# Patient Record
Sex: MALE | Race: WHITE
[De-identification: names, ages, dates, MRNs, and addresses within clinical notes are randomized per-mention and may not be internally consistent; named-entity substitution may affect disease eponyms.]

---

## 2017-05-29 ENCOUNTER — HOSPITAL ENCOUNTER (EMERGENCY)
Dept: HOSPITAL 76 - ED | Age: 22
Discharge: HOME | End: 2017-05-29
Payer: COMMERCIAL

## 2017-05-29 VITALS — DIASTOLIC BLOOD PRESSURE: 74 MMHG | SYSTOLIC BLOOD PRESSURE: 144 MMHG

## 2017-05-29 DIAGNOSIS — Z87.891: ICD-10-CM

## 2017-05-29 DIAGNOSIS — S93.401A: Primary | ICD-10-CM

## 2017-05-29 PROCEDURE — 99283 EMERGENCY DEPT VISIT LOW MDM: CPT

## 2017-05-29 PROCEDURE — 73610 X-RAY EXAM OF ANKLE: CPT

## 2017-05-29 PROCEDURE — 99282 EMERGENCY DEPT VISIT SF MDM: CPT

## 2017-05-29 NOTE — XRAY PRELIMINARY REPORT
Accession: [id number]

Exam: XR Ankle 3 View RT

 

IMPRESSION: Normal ankle radiography.

 

[facility]

 

SITE ID: [id number]

## 2017-05-29 NOTE — XRAY REPORT
EXAM:

RIGHT ANKLE RADIOGRAPHY

 

EXAM DATE: 5/29/2017 09:44 PM.

 

CLINICAL HISTORY: Rolled ankle hardly able to bear weight.

 

COMPARISON: None.

 

TECHNIQUE: 3 views.

 

FINDINGS: 

Bones: Normal. No fractures or bone lesions.

 

Joints: Normal. No effusion. No subluxations. The ankle mortise is normally aligned.

 

Soft Tissues: Normal. No soft tissue swelling.

 

IMPRESSION: Normal ankle radiography.

 

[facility]

Referring Provider Line: [phone]

 

SITE ID: [id number]

## 2017-05-29 NOTE — ED PHYSICIAN DOCUMENTATION
PD HPI LOWER EXT INJURY





- Stated complaint


Stated Complaint: R ANKLE INJ





- Chief complaint


Chief Complaint: Ext Problem





- History obtained from


History obtained from: Patient





- History of Present Illness


PD HPI LOW EXT INJURY LOCATION: Right, Ankle


Type of injury: Twist


Timing - onset: Today


Timing - details: Abrupt onset, Still present


Worsened by: Moving, Palpating, Other (walking)


Associated symptoms: Swelling.  No: Weakness, Numbness


Similar symptoms before: Has not had sx before


Recently seen: Not recently seen





Review of Systems


Constitutional: denies: Fever, Chills


Skin: denies: Abrasion (s), Laceration (s)


Neurologic: denies: Focal weakness, Numbness





PD PAST MEDICAL HISTORY





- Past Medical History


Cardiovascular: None


Respiratory: None


Neuro: None


Endocrine/Autoimmune: None


GI: None


: None


HEENT: None


Psych: None


Musculoskeletal: None


Derm: None





- Past Surgical History


Past Surgical History: No





- Present Medications


Home Medications: 


 Ambulatory Orders











 Medication  Instructions  Recorded  Confirmed


 


No Known Home Medications [No  05/29/17 05/29/17





Known Home Medications]   














- Allergies


Allergies/Adverse Reactions: 


 Allergies











Allergy/AdvReac Type Severity Reaction Status Date / Time


 


No Known Drug Allergies Allergy   Verified 05/29/17 21:13














- Social History


Does the pt smoke?: No


Smoking Status: Former smoker


Does the pt drink ETOH?: Yes


Does the pt have substance abuse?: No





- Immunizations


Immunizations are current?: Yes





PD ED PE NORMAL





- Vitals


Vital signs reviewed: Yes





- General


General: Alert and oriented X 3, No acute distress, Well developed/nourished





- HEENT


HEENT: Atraumatic





- Neck


Neck: Supple, no meningeal sign, No bony TTP





- Derm


Derm: Normal color, Warm and dry





- Extremities


Extremities: Other (right ankle with lateral swelling. Achilles not tender. 

Normal color and cap refill in toes. Foot distally not tender. No gross laxity 

on stress testing.)





- Neuro


Neuro: Alert and oriented X 3, No motor deficit, No sensory deficit, Normal 

speech





Results





- Vitals


Vitals: 


 Oxygen











O2 Source                      Room air

















- Rads (name of study)


  ** ankle


Radiology: Prelim report reviewed (no fractures), EMP read contemporaneously (

no fracture)





PD MEDICAL DECISION MAKING





- ED course


Complexity details: reviewed results, considered differential, d/w patient





Departure





- Departure


Disposition:  Home, Self Care


Clinical Impression: 


Ankle sprain


Qualifiers:


 Encounter type: initial encounter Involved ligament of ankle: other ligament 

Laterality: right Qualified Code(s): S93.491A - Sprain of other ligament of 

right ankle, initial encounter


Condition: Stable


Record reviewed to determine appropriate education?: Yes


Instructions:  ED Sprain Ankle W X Ray


Follow-Up: 


[facility] [place] [Provider Group]


Comments: 


Ankle brace when up and around for 2-3 weeks until fully healed. Crutches as 

needed initially for pain, and progress weight bearing as able. Ibuprofen 3 

times daily, and add Tylenol if needed. Recheck if not better over the next 

several days to a week. 


Forms:  Activity restrictions


Discharge Date/Time: 05/29/17 23:05

## 2023-03-22 ENCOUNTER — HOSPITAL ENCOUNTER (OUTPATIENT)
Dept: HOSPITAL 76 - SC | Age: 28
Discharge: HOME | End: 2023-03-22
Attending: NURSE PRACTITIONER
Payer: COMMERCIAL

## 2023-03-22 VITALS — SYSTOLIC BLOOD PRESSURE: 150 MMHG | DIASTOLIC BLOOD PRESSURE: 100 MMHG

## 2023-03-22 DIAGNOSIS — G47.33: Primary | ICD-10-CM

## 2023-03-22 DIAGNOSIS — E66.9: ICD-10-CM

## 2023-03-22 PROCEDURE — 99212 OFFICE O/P EST SF 10 MIN: CPT

## 2023-03-22 PROCEDURE — 99203 OFFICE O/P NEW LOW 30 MIN: CPT

## 2023-03-22 NOTE — SLEEP CARE CONSULTATION
Information from patient questionnaire entered by Brenda Estrella.





I have reviewed and concur with the information entered by Brenda Estrella. This 

document represents the service I personally performed and the decisions made by

me, Julia Mosley ARNP.





History of Present Illness


Service Date and Time: 03/22/2023    1017


Reason for Visit: New patient


Chief Complaint: reports: Unrefreshed sleep, Snoring, Observed pauses in 

breathing, Frequent awakenings at night


Date of Onset: 3-4YRS


Usual bedtime: 10PM


Time it takes to fall asleep: 30-60MIN


Snores at night: Yes


Observed to quit breathing while asleep: Yes


Sleeps alone due to snoring: No


Number of times waking at night: 5+


Reasons for waking at night: reports: Gasping for air, Other (UNKNOWN; 

temperature).  denies: Choking, Snoring


Toss, Turn, or Twitch while sleeping: Yes


Recalls having dreams: Yes


Usually gets out of bed at: 6AM; weekends 0800


Feels refreshed in the morning: No


Morning headache: Yes (3 days a week; last about an hour)


Sleepy or fatigued during the day: Yes


Ever fallen asleep while driving: No


Takes day naps: Yes (only on weekends for 30-60 minutes)


Dreams during day naps: No


Prior sleep studies: Yes


Year and Where: 07/2019 [facility]


Additional HPI information: 





I had the pleasure of seeing JAMES ZAVALA today regarding the possibility of 

him having a sleep disorder. His current complaints are unrefreshed sleep, 

snoring, observed pauses in breathing and frequent night awakenings. He states 

he has had a previous sleep study in about 2019 and was placed on a CPAP 

machine. 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 He has not used it 

since about 2020. His sleep is not as regenerative as it has been in the past. 

He snores but his girlfriend states it is not every night. She has noted some 

pauses in breathing. She can still sleep in same room. 





- Parasomnia Symptoms


Ever been unable to move upon waking from sleep: Yes (2-3 times in life)


Walks in sleep: Yes (not for a "while")


Talks in sleep: Yes


Ever acted out dreams in sleep: No


Ever felt weak in the knees when startled or emotional: No


Bothered by creepy, crawly, restless sensations in legs: Yes (leg moving in bed,

without conciously doing it)


Problems with memory or concentration: Yes (both; concentration worse, hx of 

ADHD)





Subjective


Initial [place] Sleepiness Scale score: 8 (03/17/23)





Past Medical History


Past Medical History: reports: Anxiety, Depression, Attention deficit





Social History


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 Patient is Single and lives in . 





Have you smoked in the past 12 months: No


Alcohol use: Yes


Alcohol amount and frequency: 2-3 BEERS 1-2 X MONTH


Caffeine use: Yes


Caffeine amount and frequency: 2-3 DAILY





Family History


Family history of sleep disordered breathing: Yes


Family Hx Sleep Apnea: Father: Snoring, Sleep apnea - Untreated, Sibling: 

Snoring, Sleep apnea - Untreated, Grandparent: Snoring, Sleep apnea - Untreated





Allergies and Home Medications


Known drug allergies: No


Drug allergies reviewed: Yes


Home medication list reviewed: Yes


Allergy and home medication list: 


Allergies





No Known Drug Allergies Allergy (Verified 03/21/23 13:30)


   


Medications:


Adderall ER 20 mg, daily





Review of Systems


Weight gain over past 5 years: 50


Cardiovascular: reports: high blood pressure (in Dr office, resolved with diet 

changes)


Respiratory: denies: shortness of breath


Gastrointestinal: reports: heartburn, diarrhea, abdominal pain


Neurological: denies: headaches, head trauma


Psychiatric: reports: Attention Deficit Hyperactivity, anxiety, depression


Ear/Nose/Throat: reports: wisdom teeth removed.  denies: injury to nose, 

tonsillectomy


Endocrine: denies: thyroid disease


Musculoskeletal: reports: joint pain, neck pain, back pain





Physical Exam


Vital signs obtained and entered by: BRENDA NICOLE MA


Blood Pressure: 150/100 (LEFT ARM; 148/100, recheck)


Cuff size: regular


Heart Rate: 87


O2 Saturation: 97


Height: 6 ft 4 in


Weight: 275 lb 12.8 oz


Body Mass Index: 33.5


BMI Classification: Obese


Neck circumference: 17.5


Mouth and throat: narrow oropharynx


Soft palate: long


Hard palate: normal


Uvula: normal


Uvula visualization: 25% Mallampati Class III


Tongue: enlarged in size with teeth marks on lateral edges


Tonsils: small


Neck: normal w/o lymphadenopathy or thyromegaly


Heart: regular rate and rhythm


Lungs: clear bilaterally





Impression and Plan





1. Suspected Obstructive Sleep Apnea-Hypopnea Syndrome, as previously diagnosed 

and as suggested by a history of loud and irregular snoring, observed cessation 

of breath while asleep, gasping or choking in sleep, morning headache, frequent 

awakening during the night, unrefreshed sleep and cognitive impairment. Narrow 

oropharynx and obesity are common predisposing factors for obstructive sleep 

apnea-hypopnea syndrome. I recommend proceeding to polysomnography to confirm 

the diagnosis and to assess severity. If the patient has significant sleep 

disordered breathing, a manual CPAP titration study will also be performed to 

find the optimal treatment pressure. I informed the patient of what the sleep 

studies involve and after some discussion, obtained agreement to proceed. The 

pathophysiology of obstructive sleep apnea-hypopnea syndrome was discussed with 

the patient and health risks of cardiovascular and cerebrovascular disease if 

not treated.  Risks of drowsy driving discussed in detail and patient advised to

avoid long distance driving and to pull over at the first sign of drowsiness. 

Patient agreed to plan. 





2. Elevated blood pressure reading during office visit. Patient had an initial 

blood pressure of 150/100 and follow up 148/100 at end of visit. He states 15-20

minutes prior to coming in for visit he had an energy drink. He states he has 

had elevate blood pressures in the past that resolved with some diet changes. He

denies chest pain, shortness of breath, headache or dizziness at this time. He 

feels well. He was encouraged to follow up with PCP as needed. 





* Schedule polysomnography 


* Avoid long distance driving or driving when feeling sleepy.


* Avoid alcohol, sedative and muscle relaxant around bedtime.


* Attempt to lose weight.


* Review instructions provided by trained office staff on how to prepare for the

  sleep study.


* Return for follow-up after sleep study completed.








Counseling Topics: Weight loss health impact


Visit Type: In Office


Time Spent with Patient (minutes): 31


Provider Statement: I spent 100% of the Face to Face Visit with the patient with

greater than 50% spent counseling the patient and coordination of care.